# Patient Record
Sex: MALE | Race: WHITE | NOT HISPANIC OR LATINO | Employment: FULL TIME | URBAN - METROPOLITAN AREA
[De-identification: names, ages, dates, MRNs, and addresses within clinical notes are randomized per-mention and may not be internally consistent; named-entity substitution may affect disease eponyms.]

---

## 2017-08-08 ENCOUNTER — NON-PROVIDER VISIT (OUTPATIENT)
Dept: CARDIOLOGY | Facility: MEDICAL CENTER | Age: 27
End: 2017-08-08
Payer: COMMERCIAL

## 2017-08-08 ENCOUNTER — OFFICE VISIT (OUTPATIENT)
Dept: CARDIOLOGY | Facility: MEDICAL CENTER | Age: 27
End: 2017-08-08
Payer: COMMERCIAL

## 2017-08-08 VITALS
WEIGHT: 163 LBS | HEART RATE: 59 BPM | BODY MASS INDEX: 24.14 KG/M2 | DIASTOLIC BLOOD PRESSURE: 70 MMHG | OXYGEN SATURATION: 97 % | HEIGHT: 69 IN | SYSTOLIC BLOOD PRESSURE: 116 MMHG

## 2017-08-08 DIAGNOSIS — I49.5 SICK SINUS SYNDROME (HCC): ICD-10-CM

## 2017-08-08 DIAGNOSIS — Z95.0 CARDIAC PACEMAKER IN SITU: ICD-10-CM

## 2017-08-08 DIAGNOSIS — I48.91 ATRIAL FIBRILLATION, UNSPECIFIED TYPE (HCC): ICD-10-CM

## 2017-08-08 DIAGNOSIS — I45.5 SINUS ARREST: ICD-10-CM

## 2017-08-08 LAB — EKG IMPRESSION: NORMAL

## 2017-08-08 PROCEDURE — 99213 OFFICE O/P EST LOW 20 MIN: CPT | Performed by: INTERNAL MEDICINE

## 2017-08-08 PROCEDURE — 93000 ELECTROCARDIOGRAM COMPLETE: CPT | Performed by: INTERNAL MEDICINE

## 2017-08-08 PROCEDURE — 93280 PM DEVICE PROGR EVAL DUAL: CPT | Performed by: INTERNAL MEDICINE

## 2017-08-08 ASSESSMENT — ENCOUNTER SYMPTOMS: PALPITATIONS: 0

## 2017-08-08 NOTE — PROGRESS NOTES
"Subjective:   Gene Ashraf is a 27 y.o. male who presents today for follow up of ppm  Bostom ppm 8.5 years battery left.    No passing out  No more a fib        No past medical history on file.  Past Surgical History:   Procedure Laterality Date   • OTHER      RHINOPLASTY     History reviewed. No pertinent family history.  History   Smoking Status   • Never Smoker   Smokeless Tobacco   • Not on file     Allergies   Allergen Reactions   • Ceclor [Cefaclor] Rash           Outpatient Encounter Prescriptions as of 8/8/2017   Medication Sig Dispense Refill   • multivitamin (THERAGRAN) TABS Take 1 Tab by mouth every day.     • Omega-3 Fatty Acids (OMEGA 3 PO) Take 2 Tabs by mouth every day.     • Ascorbic Acid (VITAMIN C PO) Take 1 Tab by mouth every day.     • BOOSTRIX 5-2.5-18.5 Suspension ADM 0.5ML IM UTD  0   • oxycodone immediate-release (ROXICODONE) 5 MG TABS Take 1 Tab by mouth every 3 hours as needed. 30 Tab 0   • doxycycline monohydrate (ADOXA) 100 MG tablet Take 1 Tab by mouth 2 times a day. 10 Tab 0   • Capsicum, Cayenne, (CAYENNE PO) Take 2 Tabs by mouth every day.       No facility-administered encounter medications on file as of 8/8/2017.      Review of Systems   Cardiovascular: Negative for palpitations.        Objective:   /70   Pulse (!) 59   Ht 1.753 m (5' 9.02\")   Wt 73.9 kg (163 lb)   SpO2 97%   BMI 24.06 kg/m²     Physical Exam   Constitutional: He is oriented to person, place, and time. He appears well-developed and well-nourished. No distress.   HENT:   Head: Normocephalic and atraumatic.   Right Ear: External ear normal.   Left Ear: External ear normal.   Nose: Nose normal.   Mouth/Throat: Oropharynx is clear and moist.   Eyes: Conjunctivae and EOM are normal. Pupils are equal, round, and reactive to light. Right eye exhibits no discharge. Left eye exhibits no discharge. No scleral icterus.   Neck: Normal range of motion. Neck supple. No JVD present. No tracheal deviation present. "   Cardiovascular: Normal rate, regular rhythm, normal heart sounds and intact distal pulses.  Exam reveals no gallop and no friction rub.    No murmur heard.  Well healed device pocket   Pulmonary/Chest: Effort normal and breath sounds normal. No stridor. No respiratory distress. He has no wheezes. He has no rales. He exhibits no tenderness.   Abdominal: Soft. He exhibits no distension. There is no tenderness.   Musculoskeletal: He exhibits no edema or tenderness.   Neurological: He is alert and oriented to person, place, and time. No cranial nerve deficit. Coordination normal.   Skin: Skin is warm and dry. No rash noted. He is not diaphoretic. No erythema. No pallor.   Psychiatric: He has a normal mood and affect. His behavior is normal. Judgment and thought content normal.   Vitals reviewed.      Assessment:     1. Atrial fibrillation, unspecified type (CMS-HCC)  EKG   2. Cardiac pacemaker in situ     3. Sinus arrest         Medical Decision Making:  Today's Assessment / Status / Plan:     Doing well with ppm  No more af noted- continue to follow- low chads vasc risk

## 2017-08-08 NOTE — MR AVS SNAPSHOT
"        Gene Ashraf   2017 4:20 PM   Office Visit   MRN: [id number]    Department:  [facility]   Dept Phone:  [phone]    Description:  Male : 1990   Provider:  Richy Elena M.D.           Reason for Visit     Follow-Up           Allergies as of 2017     Allergen Noted Reactions    Ceclor [Cefaclor] 2015   Rash           You were diagnosed with     Atrial fibrillation, unspecified type (CMS-[facility])   [[id number]]         Vital Signs     Blood Pressure Pulse Height Weight Body Mass Index Oxygen Saturation    116/70 mmHg 59 1.753 m (5' 9.02\") 73.936 kg (163 lb) 24.06 kg/m2 97%    Smoking Status                   Never Smoker            Basic Information     Date Of Birth Sex Race Ethnicity Preferred Language    1990 Male White Non- English      Problem List              ICD-10-CM Priority Class Noted - Resolved    Sinus arrest I45.5   2015 - Present    Cardiac pacemaker in situ Z95.0   2015 - Present      Health Maintenance        Date Due Completion Dates    IMM HEP B VACCINE (1 of 3 - Primary Series) 1990 ---    IMM HEP A VACCINE (1 of 2 - Standard Series) 1991 ---    IMM VARICELLA (CHICKENPOX) VACCINE (1 of 2 - 2 Dose Adolescent Series) 2003 ---    IMM DTaP/Tdap/Td Vaccine (1 - Tdap) 2009 ---    IMM INFLUENZA (1) 2017 ---            Results       Current Immunizations     No immunizations on file.      Below and/or attached are the medications your provider expects you to take. Review all of your home medications and newly ordered medications with your provider and/or pharmacist. Follow medication instructions as directed by your provider and/or pharmacist. Please keep your medication list with you and share with your provider. Update the information when medications are discontinued, doses are changed, or new medications (including over-the-counter products) are added; and carry medication information at all times in the event of emergency " situations     Allergies:  CECLOR - Rash               Medications  Valid as of: August 08, 2017 -  4:51 PM    Generic Name Brand Name Tablet Size Instructions for use    Ascorbic Acid   Take 1 Tab by mouth every day.        Capsicum (Cayenne)   Take 2 Tabs by mouth every day.        Doxycycline Monohydrate (Tab) ADOXA 100 MG Take 1 Tab by mouth 2 times a day.        Multiple Vitamin (Tab) THERAGRAN  Take 1 Tab by mouth every day.        Omega-3 Fatty Acids   Take 2 Tabs by mouth every day.        OxyCODONE HCl (Tab) ROXICODONE 5 MG Take 1 Tab by mouth every 3 hours as needed.        Tetanus-Diphth-Acell Pertussis (Suspension) BOOSTRIX 5-2.5-18.5 ADM 0.5ML IM UTD        .                 Medicines prescribed today were sent to:     [id number] - [place], [place] - [address]    [address] [address]    Phone: [phone] Fax: [phone]    Open 24 Hours?: No      Medication refill instructions:       If your prescription bottle indicates you have medication refills left, it is not necessary to call your provider’s office. Please contact your pharmacy and they will refill your medication.    If your prescription bottle indicates you do not have any refills left, you may request refills at any time through one of the following ways: The online [employer] system (except Urgent Care), by calling your provider’s office, or by asking your pharmacy to contact your provider’s office with a refill request. Medication refills are processed only during regular business hours and may not be available until the next business day. Your provider may request additional information or to have a follow-up visit with you prior to refilling your medication.   *Please Note: Medication refills are assigned a new Rx number when refilled electronically. Your pharmacy may indicate that no refills were authorized even though a new prescription for the same medication is available at the pharmacy. Please request the medicine  by name with the pharmacy before contacting your provider for a refill.           MyChart Access Code: Activation code not generated  Current MyChart Status: Active

## 2019-04-29 ENCOUNTER — OFFICE VISIT (OUTPATIENT)
Dept: CARDIOLOGY | Facility: MEDICAL CENTER | Age: 29
End: 2019-04-29
Payer: COMMERCIAL

## 2019-04-29 ENCOUNTER — NON-PROVIDER VISIT (OUTPATIENT)
Dept: CARDIOLOGY | Facility: MEDICAL CENTER | Age: 29
End: 2019-04-29
Payer: COMMERCIAL

## 2019-04-29 VITALS
BODY MASS INDEX: 22.81 KG/M2 | DIASTOLIC BLOOD PRESSURE: 60 MMHG | HEART RATE: 58 BPM | WEIGHT: 154 LBS | SYSTOLIC BLOOD PRESSURE: 100 MMHG | HEIGHT: 69 IN | OXYGEN SATURATION: 96 %

## 2019-04-29 DIAGNOSIS — R55 SYNCOPE AND COLLAPSE: ICD-10-CM

## 2019-04-29 DIAGNOSIS — I48.0 PAROXYSMAL ATRIAL FIBRILLATION (HCC): ICD-10-CM

## 2019-04-29 DIAGNOSIS — Z95.0 CARDIAC PACEMAKER IN SITU: ICD-10-CM

## 2019-04-29 DIAGNOSIS — Z95.0 CARDIAC PACEMAKER IN SITU: Primary | ICD-10-CM

## 2019-04-29 DIAGNOSIS — I45.5 SINUS ARREST: ICD-10-CM

## 2019-04-29 PROCEDURE — 93280 PM DEVICE PROGR EVAL DUAL: CPT | Performed by: INTERNAL MEDICINE

## 2019-04-29 PROCEDURE — 99214 OFFICE O/P EST MOD 30 MIN: CPT | Mod: 25 | Performed by: INTERNAL MEDICINE

## 2019-04-29 NOTE — PROGRESS NOTES
Arrhythmia Clinic Note (Established patient)    DOS: 4/29/2019    Chief complaint/Reason for consult: F/u SSS and PPM    Interval History:  Patient is a 30 yo M. Had >20 second sinus pause resulting in syncope. Also with PAF. S/p dual chamber [place] [employer] PPM. Since then syncope has resolved. No further PAF. 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 Has missed a few f/u. Here today for device check. No complaints.    ROS (+ highlighted in red):  Constitutional: Fevers/chills/fatigue/weightloss  HEENT: Blurry vision/eye pain/sore throat/hearing loss  Respiratory: Shortness of breath/cough  Cardiovascular: Chest pain/palpitations/edema/orthopnea/syncope  GI: Nausea/vomitting/diarrhea  MSK: Arthralgias/myagias/muscle weakness  Skin: Rash/sores  Neurological: Numbness/tremors/vertigo  Endocrine: Excessive thirst/polyuria/cold intolerance/heat intolerance  Psych: Depression/anxiety    PMhx:  SSS  Pacemaker  PAF    Past Surgical History:   Procedure Laterality Date   • OTHER      RHINOPLASTY       Social History     Social History   • Marital status: Single     Spouse name: N/A   • Number of children: N/A   • Years of education: N/A     Occupational History   • Not on file.     Social History Main Topics   • Smoking status: Never Smoker   • Smokeless tobacco: Never Used   • Alcohol use Yes   • Drug use: No   • Sexual activity: Not on file     Other Topics Concern   • Not on file     Social History Narrative   • No narrative on file       No family history on file.    Allergies   Allergen Reactions   • Ceclor [Cefaclor] Rash             Current Outpatient Prescriptions   Medication Sig Dispense Refill   • BOOSTRIX 5-2.5-18.5 Suspension ADM 0.5ML IM UTD  0   • oxycodone immediate-release (ROXICODONE) 5 MG TABS Take 1 Tab by mouth every 3 hours as needed. (Patient not taking: Reported on 4/29/2019) 30 Tab 0   • doxycycline monohydrate (ADOXA) 100 MG tablet Take 1 Tab by mouth 2 times a day. (Patient not taking: Reported on 4/29/2019)  "10 Tab 0   • multivitamin (THERAGRAN) TABS Take 1 Tab by mouth every day.     • Omega-3 Fatty Acids (OMEGA 3 PO) Take 2 Tabs by mouth every day.     • Ascorbic Acid (VITAMIN C PO) Take 1 Tab by mouth every day.     • Capsicum, Cayenne, (CAYENNE PO) Take 2 Tabs by mouth every day.       No current facility-administered medications for this visit.        Physical Exam:  Vitals:    04/29/19 1423   BP: 100/60   BP Location: Left arm   Patient Position: Sitting   BP Cuff Size: Adult   Pulse: (!) 58   SpO2: 96%   Weight: 69.9 kg (154 lb)   Height: 1.753 m (5' 9\")     General appearance: NAD, conversant   Eyes: anicteric sclerae, moist conjunctivae; no lid-lag; PERRLA  HENT: Atraumatic; oropharynx clear with moist mucous membranes and no mucosal ulcerations; normal hard and soft palate  Neck: Trachea midline; FROM, supple, no thyromegaly or lymphadenopathy  Lungs: CTA, with normal respiratory effort and no intercostal retractions  CV: RRR, no MRGs, no JVD  Abdomen: Soft, non-tender; no masses or HSM  Extremities: No peripheral edema or extremity lymphadenopathy  Skin: Normal temperature, turgor and texture; no rash, ulcers or subcutaneous nodules  Psych: Appropriate affect, alert and oriented to person, place and time    Data:  Labs reviewed    Prior echo/stress reviewed:  Normal    Device interrogation reviewed, working appropriately    Impression/Plan:  1. Cardiac pacemaker in situ     2. Sinus arrest     3. Syncope and collapse     4. Paroxysmal atrial fibrillation (HCC)       -Since device placed no further PAF episodes  -Syncope resolved  -Device interrogation reviewed, working appropriately  -Will order remote monitoring device for them  -Remote check in 6 mo, f/u here in 12 mo    Ivis Israel MD    "